# Patient Record
(demographics unavailable — no encounter records)

---
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
## 2024-10-10 NOTE — PHYSICAL EXAM
[Normal Breath Sounds] : Normal breath sounds [Normal Heart Sounds] : normal heart sounds [Abdomen Tenderness] : ~T ~M No abdominal tenderness [No Rash or Lesion] : No rash or lesion [Purpura] : no purpura  [Petechiae] : no petechiae [Skin Ulcer] : no ulcer [Skin Induration] : no induration [Alert] : alert [Oriented to Person] : oriented to person [Oriented to Place] : oriented to place [Oriented to Time] : oriented to time [Calm] : calm [de-identified] : wdwn  male  in  nad [de-identified] : non icteric sclera  PERRLA EOMS intact  and  nl [de-identified] : trachea  midline [de-identified] : soft  abdomen    no  guarding    midline incision site healed c/d/i ,  small area of swelling   lateral to incision site on right  side is resolving ,non tender to palpation  no  guarding  no  rebound

## 2024-10-10 NOTE — ASSESSMENT
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
## 2024-11-01 NOTE — ASSESSMENT
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 ED

## 2024-11-01 NOTE — PHYSICAL EXAM
[Normal Breath Sounds] : Normal breath sounds [Normal Heart Sounds] : normal heart sounds [Abdomen Tenderness] : ~T ~M No abdominal tenderness [No Rash or Lesion] : No rash or lesion [Purpura] : no purpura  [Petechiae] : no petechiae [Skin Ulcer] : no ulcer [Skin Induration] : no induration [Alert] : alert [Oriented to Person] : oriented to person [Oriented to Place] : oriented to place [Oriented to Time] : oriented to time [Calm] : calm [de-identified] : wdwn  male  in  nad [de-identified] : non icteric sclera  PERRLA EOMS intact  and  nl [de-identified] : trachea  midline [de-identified] : soft  abdomen    no  guarding    midline incision site healed c/d/i ,  small area of swelling   lateral to incision site on right  side is resolving ,non tender to palpation  no  guarding  no  rebound

## 2024-11-22 NOTE — PHYSICAL EXAM
[Normal Breath Sounds] : Normal breath sounds [Normal Heart Sounds] : normal heart sounds [Abdomen Tenderness] : ~T ~M No abdominal tenderness [No Rash or Lesion] : No rash or lesion [Purpura] : no purpura  [Petechiae] : no petechiae [Skin Ulcer] : no ulcer [Skin Induration] : no induration [Alert] : alert [Oriented to Person] : oriented to person [Oriented to Place] : oriented to place [Oriented to Time] : oriented to time [Calm] : calm [de-identified] : wdwn  male  in  nad [de-identified] : non icteric sclera  PERRLA EOMS intact  and  nl [de-identified] : trachea  midline [de-identified] : soft  abdomen    no  guarding    midline incision site healed c/d/i ,  small area of swelling   lateral to incision site on right  side has resolved ,non tender to palpation  no  guarding  no  rebound

## 2024-11-22 NOTE — ASSESSMENT
[FreeTextEntry1] : RTC  prn  Instructed patent   on diet modification to relieve stress to abdominal wall by loosing some  weight - Return to work 12/09/2024 Maintain regular bowel regimen F/u PMD Discussion with patient   All questions answered  Any acute symptoms and or concerns patient understands  to call back office  and  or  go  directly to [facility] ED
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
